# Patient Record
Sex: MALE | Race: WHITE | HISPANIC OR LATINO | Employment: FULL TIME | URBAN - METROPOLITAN AREA
[De-identification: names, ages, dates, MRNs, and addresses within clinical notes are randomized per-mention and may not be internally consistent; named-entity substitution may affect disease eponyms.]

---

## 2017-07-19 ENCOUNTER — HOSPITAL ENCOUNTER (EMERGENCY)
Facility: MEDICAL CENTER | Age: 32
End: 2017-07-19
Attending: EMERGENCY MEDICINE
Payer: COMMERCIAL

## 2017-07-19 VITALS
RESPIRATION RATE: 18 BRPM | TEMPERATURE: 99 F | WEIGHT: 152.31 LBS | DIASTOLIC BLOOD PRESSURE: 72 MMHG | OXYGEN SATURATION: 98 % | BODY MASS INDEX: 23.08 KG/M2 | SYSTOLIC BLOOD PRESSURE: 139 MMHG | HEIGHT: 68 IN | HEART RATE: 90 BPM

## 2017-07-19 DIAGNOSIS — F41.8 SITUATIONAL ANXIETY: ICD-10-CM

## 2017-07-19 DIAGNOSIS — J02.9 PHARYNGITIS, UNSPECIFIED ETIOLOGY: ICD-10-CM

## 2017-07-19 LAB
DEPRECATED S PYO AG THROAT QL EIA: NORMAL
SIGNIFICANT IND 70042: NORMAL
SITE SITE: NORMAL
SOURCE SOURCE: NORMAL

## 2017-07-19 PROCEDURE — 87880 STREP A ASSAY W/OPTIC: CPT

## 2017-07-19 PROCEDURE — 87081 CULTURE SCREEN ONLY: CPT

## 2017-07-19 PROCEDURE — 99284 EMERGENCY DEPT VISIT MOD MDM: CPT

## 2017-07-19 ASSESSMENT — ENCOUNTER SYMPTOMS
COUGH: 0
ABDOMINAL PAIN: 0
SORE THROAT: 1
FEVER: 1

## 2017-07-19 ASSESSMENT — PAIN SCALES - GENERAL: PAINLEVEL_OUTOF10: 0

## 2017-07-19 NOTE — ED AVS SNAPSHOT
7/19/2017    Mack Vance  [address]   [place] [address]    Dear Mack:    [facility] wants to ensure your discharge home is safe and you or your loved ones have had all of your questions answered regarding your care after you leave the hospital.    Below is a list of resources and contact information should you have any questions regarding your hospital stay, follow-up instructions, or active medical symptoms.    Questions or Concerns Regarding… Contact   Medical Questions Related to Your Discharge  (7 days a week, 8am-5pm) Contact a Nurse Care Coordinator   [phone]   Medical Questions Not Related to Your Discharge  (24 hours a day / 7 days a week)  Contact the Nurse Health Line   [phone]    Medications or Discharge Instructions Refer to your discharge packet   or contact your [facility] Primary Care Provider   [phone]   Follow-up Appointment(s) Schedule your appointment via Sourcebits   or contact Scheduling [phone]   Billing Review your statement via Sourcebits  or contact Billing [phone]   Medical Records Review your records via Sourcebits   or contact Medical Records [phone]     You may receive a telephone call within two days of discharge. This call is to make certain you understand your discharge instructions and have the opportunity to have any questions answered. You can also easily access your medical information, test results and upcoming appointments via the Sourcebits free online health management tool. You can learn more and sign up at Medocity/Sourcebits. For assistance setting up your Sourcebits account, please call [phone].    Once again, we want to ensure your discharge home is safe and that you have a clear understanding of any next steps in your care. If you have any questions or concerns, please do not hesitate to contact us, we are here for you. Thank you for choosing [facility] for your healthcare needs.    Sincerely,    Your [facility], [place], operated by [facility]  Team

## 2017-07-19 NOTE — ED PROVIDER NOTES
"ED Provider Note    Scribed for Marek Munroe M.D. by Bill Mcdonough. 7/19/2017, 4:19 PM.    Primary care provider: Pcp Pt States None  Means of arrival: Walk-in  History obtained from: Patient  History limited by: None    CHIEF COMPLAINT  Chief Complaint   Patient presents with   • Sore Throat     started 2 weeks ago. denies trouble swallowing or eating. pt states he noticed back of throat swollen        HPI  Mack Vance is a 31 y.o. male who presents to the Emergency Department for evaluation of constant sore throat, onset 3 days ago on Monday.  Patient states the pain is worse in the morning. This morning he looked at the back of his throat and stated it \"looked ugly\" so he wanted to come into the ED for further evaluation. Patient denies any associated symptoms of runny nose, cough or abdominal pain. Patient has a low grade fever. He has no pertinent medical history and no further complaints at this time.     REVIEW OF SYSTEMS  Review of Systems   Constitutional: Positive for fever.   HENT: Positive for sore throat. Negative for congestion.    Respiratory: Negative for cough.    Gastrointestinal: Negative for abdominal pain.       PAST MEDICAL HISTORY  No pertinent medical history     SURGICAL HISTORY  patient denies any surgical history    SOCIAL HISTORY  Social History   Substance Use Topics   • Smoking status: Never Smoker    • Smokeless tobacco: Never Used   • Alcohol Use: No      History   Drug Use No       FAMILY HISTORY  No pertinent family history    CURRENT MEDICATIONS  No current facility-administered medications on file prior to encounter.     No current outpatient prescriptions on file prior to encounter.     ALLERGIES  No Known Allergies    PHYSICAL EXAM  VITAL SIGNS: /66 mmHg  Pulse 101  Temp(Src) 37.2 °C (99 °F)  Resp 18  Ht 1.727 m (5' 8\")  Wt 69.088 kg (152 lb 5 oz)  BMI 23.16 kg/m2  SpO2 98%  Vitals reviewed.  Constitutional: Well developed, Well " nourished, No acute distress, Non-toxic appearance.   HENT: Normocephalic, Atraumatic, Bilateral external ears normal, Oropharynx moist, [place] to posterior pharynxNo oral exudates, Nose normal.  No exudates or signs of abscess.  Eyes: PERRL, EOMI, Conjunctiva normal, No discharge.   Neck: Normal range of motion, No tenderness, Supple, No stridor.  No lymphadenopathy  Cardiovascular: Normal heart rate, Normal rhythm, No murmurs, No rubs, No gallops.   Thorax & Lungs: Normal breath sounds, No respiratory distress, No wheezing, No chest tenderness.   Abdomen: Bowel sounds normal, Soft, No tenderness  Skin: Warm, Dry, No erythema, No rash.   Back: No tenderness, No CVA tenderness.   Musculoskeletal: Good range of motion in all major joints. No edema. No tenderness to palpation or major deformities noted.   Neurologic: Alert, Normal motor function, Normal sensory function, No focal deficits noted.   Psychiatric: Affect normal      LABS  Results for orders placed or performed during the hospital encounter of 07/19/17   RAPID STREP, CULT IF INDICATED (CULTURE IF NEGATIVE)   Result Value Ref Range    Significant Indicator NEG     Source THRT     Site THROAT     Rapid Strep Screen       Negative for Group A streptococcus.  A negative result may be obtained if the specimen is  inadequate or antigen concentration is below the  sensitivity of the test. This negative test will be followed  up with a culture as requested.        All labs reviewed by me.    COURSE & MEDICAL DECISION MAKING  Pertinent Labs & Imaging studies reviewed. (See chart for details)    Obtained and reviewed past medical records from September 2014 which indicated patient was seen and treated for wrist laceration.    4:19 PM Patient seen and examined at bedside. The patient presents with sore throat, and the differential diagnosis includes but is not limited to strep throat, upper respiratory virus, or other.  Ordered for Rapid strep culture to  evaluate.  Center criteria is 0    Rapid strep is negative.  His afebrile and nontoxic.  The sore throat is only present for a few days.  No signs of abscess.  I suspect this is a viral etiology.  No indication of a axis time.  We discharged with return precautions and follow-up.  Return for worsening sore throat, fever, or other concerns.  Questions are answered and he is discharged in good condition.      FINAL IMPRESSION  1. Pharyngitis, unspecified etiology    2. Situational anxiety          Bill EPPS (Scribe), am scribing for, and in the presence of, Marek Munroe M.D..    Electronically signed by: Bill Mcdonough (Scribe), 7/19/2017    Marek EPPS M.D. personally performed the services described in this documentation, as scribed by Bill Mcdonough in my presence, and it is both accurate and complete.    The note accurately reflects work and decisions made by me.  Marek Munroe  7/19/2017  5:59 PM

## 2017-07-19 NOTE — ED AVS SNAPSHOT
[employer] Access Code: [email or web address]  Expires: 8/18/2017  5:19 PM    Your email address is not on file at [facility].  Email Addresses are required for you to sign up for [employer], please contact [phone] to verify your personal information and to provide your email address prior to attempting to register for [employer].    Mack Lopez ManuelDennis  [address]   [place] [address]    BigDNAt  A secure, online tool to manage your health information     [facility]’s [employer]® is a secure, online tool that connects you to your personalized health information from the privacy of your home -- day or night - making it very easy for you to manage your healthcare. Once the activation process is completed, you can even access your medical information using the [employer] dahlia, which is available for free in the Apple Dahlia store or Google Play store.     To learn more about [employer], visit [email or web address]    There are two levels of access available (as shown below):   My Chart Features  [facility] Primary Care Doctor [facility]  Specialists [facility]  Urgent  Care Non-[facility] Primary Care Doctor   Email your healthcare team securely and privately 24/7 X X X    Manage appointments: schedule your next appointment; view details of past/upcoming appointments X      Request prescription refills. X      View recent personal medical records, including lab and immunizations X X X X   View health record, including health history, allergies, medications X X X X   Read reports about your outpatient visits, procedures, consult and ER notes X X X X   See your discharge summary, which is a recap of your hospital and/or ER visit that includes your diagnosis, lab results, and care plan X X  X     How to register for BigDNAt:  Once your e-mail address has been verified, follow the following steps to sign up for BigDNAt.     1. Go to  [email or web address]  2. Click on the Sign Up Now box, which takes you to the New  Member Sign Up page. You will need to provide the following information:  a. Enter your Eachpal Access Code exactly as it appears at the top of this page. (You will not need to use this code after you’ve completed the sign-up process. If you do not sign up before the expiration date, you must request a new code.)   b. Enter your date of birth.   c. Enter your home email address.   d. Click Submit, and follow the next screen’s instructions.  3. Create a ContinuumRxt ID. This will be your Eachpal login ID and cannot be changed, so think of one that is secure and easy to remember.  4. Create a Eachpal password. You can change your password at any time.  5. Enter your Password Reset Question and Answer. This can be used at a later time if you forget your password.   6. Enter your e-mail address. This allows you to receive e-mail notifications when new information is available in Eachpal.  7. Click Sign Up. You can now view your health information.    For assistance activating your Eachpal account, call [phone]

## 2017-07-19 NOTE — ED NOTES
Chief Complaint   Patient presents with   • Sore Throat     started 2 weeks ago. denies trouble swallowing or eating. pt states he noticed back of throat swollen

## 2017-07-19 NOTE — ED AVS SNAPSHOT
Home Care Instructions                                                                                                                Mack Vance   MRN: [id number]    Department:  [facility], Emergency Dept   Date of Visit:  7/19/2017            [facility], Emergency Dept    [address]    [place] [address]    Phone:  [phone]      You were seen by     Marek Munroe M.D.      Your Diagnosis Was     Pharyngitis, unspecified etiology     J02.9       Medication Information     Review all of your home medications and newly ordered medications with your primary doctor and/or pharmacist as soon as possible. Follow medication instructions as directed by your doctor and/or pharmacist.     Please keep your complete medication list with you and share with your physician. Update the information when medications are discontinued, doses are changed, or new medications (including over-the-counter products) are added; and carry medication information at all times in the event of emergency situations.               Medication List      Notice     You have not been prescribed any medications.            Procedures and tests performed during your visit     BETA STREP SCREEN (GP. A)    RAPID STREP, CULT IF INDICATED (CULTURE IF NEGATIVE)        Discharge Instructions       Ibuprofen or time for pain.  She complained fluids.  Worsening sore throat, fever, or other concerns.      Faringitis  (Pharyngitis)  La faringitis ocurre cuando la faringe presenta enrojecimiento, dolor e hinchazón (inflamación).   CAUSAS   Normalmente, la faringitis se debe a gina infección. Generalmente, estas infecciones ocurren debido a virus (viral) y se presentan cuando las personas se resfrían. Sin embargo, a veces la faringitis es provocada por bacterias (bacteriana). Las alergias también pueden ser gina causa de la faringitis. La faringitis viral se puede contagiar de gina persona a otra  al toser, estornudar y compartir objetos o utensilios personales (tazas, tenedores, cucharas, cepillos de diente). La faringitis bacteriana se puede contagiar de gina persona a otra a través de un contacto más íntimo, stevo besar.   SIGNOS Y SÍNTOMAS   Los síntomas de la faringitis incluyen los siguientes:   · Dolor de garganta.  · Cansancio (fatiga).  · Fiebre no muy elevada.  · Dolor de taras.  · Paty musculares y en las articulaciones.  · Erupciones cutáneas  · Ganglios linfáticos hinchados.  · Gina película parecida a las placas en la garganta o las amígdalas (frecuente con la faringitis bacteriana).  DIAGNÓSTICO   El médico le hará preguntas sobre la enfermedad y loki síntomas. Normalmente, todo lo que se necesita para diagnosticar gina faringitis son loki antecedentes médicos y un examen físico. A veces se realiza gina prueba rápida para estreptococos. También es posible que se realicen otros análisis de laboratorio, según la posible causa.   TRATAMIENTO   La faringitis viral normalmente mejorará en un plazo de 3 a 4 días sin medicamentos. La faringitis bacteriana se trata con medicamentos que lian los gérmenes (antibióticos).   INSTRUCCIONES PARA EL CUIDADO EN EL HOGAR   · Neida gran cantidad de líquido para mantener la orina de dima stevie o color amarillo pálido.  · Earth solo medicamentos de venta baldemar o recetados, según las indicaciones del médico.  ¨ Si le receta antibióticos, asegúrese de terminarlos, incluso si comienza a sentirse mejor.  ¨ No tome aspirina.  · Descanse lo suficiente.  · Hágase gárgaras con 8 onzas (227 ml) de agua con sal (½ cucharadita de sal por litro de agua) cada 1 o 2 horas para calmar la garganta.  · Puede usar pastillas (si no corre riesgo de ahogarse) o aerosoles para calmar la garganta.  SOLICITE ATENCIÓN MÉDICA SI:   · Tiene bultos grandes y dolorosos en el jonah.  · Tiene gina erupción cutánea.  · Cuando tose elimina gina expectoración diogo, amarillo amarronado o con  amanda.  SOLICITE ATENCIÓN MÉDICA DE INMEDIATO SI:   · El jonah se pone rígido.  · Comienza a babear o no puede tragar líquidos.  · Vomita o no puede retener los medicamentos ni los líquidos.  · Siente un dolor intenso que no se pau con los medicamentos recomendados.  · Tiene dificultades para respirar (y no debido a la nariz tapada).  ASEGÚRESE DE QUE:   · Comprende estas instrucciones.  · Controlará farrell afección.  · Recibirá ayuda de inmediato si no mejora o si empeora.     Esta información no tiene stevo fin reemplazar el consejo del médico. Asegúrese de hacerle al médico cualquier pregunta que tenga.     Document Released: 09/27/2006 Document Revised: 10/08/2014  [employer] Interactive Patient Education ©2016 Elsevier Inc.            Patient Information     Patient Information    Following emergency treatment: all patient requiring follow-up care must return either to a private physician or a clinic if your condition worsens before you are able to obtain further medical attention, please return to the emergency room.     Billing Information    At [facility], we work to make the billing process streamlined for our patients.  Our Representatives are here to answer any questions you may have regarding your hospital bill.  If you have insurance coverage and have supplied your insurance information to us, we will submit a claim to your insurer on your behalf.  Should you have any questions regarding your bill, we can be reached online or by phone as follows:  Online: You are able pay your bills online or live chat with our representatives about any billing questions you may have. We are here to help Monday - Friday from 8:00am to 7:30pm and 9:00am - 12:00pm on Saturdays.  Please visit [email or web address]  for more information.   Phone:  [phone] or [phone]    Please note that your emergency physician, surgeon, pathologist, radiologist, anesthesiologist, and other  specialists are not employed by [facility] and will therefore bill separately for their services.  Please contact them directly for any questions concerning their bills at the numbers below:     Emergency Physician Services:  [phone]  [facility]:  [phone]  [facility]:  [phone]  [facility]:  [phone]    1. Your final bill may vary from the amount quoted upon discharge if all procedures are not complete at that time, or if your doctor has additional procedures of which we are not aware. You will receive an additional bill if you return to the Emergency Department at [facility] for suture removal regardless of the facility of which the sutures were placed.     2. Please arrange for settlement of this account at the emergency registration.    3. All self-pay accounts are due in full at the time of treatment.  If you are unable to meet this obligation then payment is expected within 4-5 days.     4. If you have had radiology studies (CT, X-ray, Ultrasound, MRI), you have received a preliminary result during your emergency department visit. Please contact the radiology department [phone] to receive a copy of your final result. Please discuss the Final result with your primary physician or with the follow up physician provided.     Crisis Hotline:  [place] Crisis Hotline:  4-694-WXVXCUP or 1-833.863.3234  [place] Crisis Hotline:    1-164.887.1777 or 063-495-2052         ED Discharge Follow Up Questions    1. In order to provide you with very good care, we would like to follow up with a phone call in the next few days.  May we have your permission to contact you?     YES /  NO    2. What is the best phone number to call you? (       )_____-__________    3. What is the best time to call you?      Morning  /  Afternoon  /  Evening                   Patient Signature:  ____________________________________________________________    Date:   ____________________________________________________________

## 2017-07-20 NOTE — DISCHARGE INSTRUCTIONS
Ibuprofen or time for pain.  She complained fluids.  Worsening sore throat, fever, or other concerns.      Faringitis  (Pharyngitis)  La faringitis ocurre cuando la faringe presenta enrojecimiento, dolor e hinchazón (inflamación).   CAUSAS   Normalmente, la faringitis se debe a gina infección. Generalmente, estas infecciones ocurren debido a virus (viral) y se presentan cuando las personas se resfrían. Sin embargo, a veces la faringitis es provocada por bacterias (bacteriana). Las alergias también pueden ser gina causa de la faringitis. La faringitis viral se puede contagiar de gina persona a otra al toser, estornudar y compartir objetos o utensilios personales (tazas, tenedores, cucharas, cepillos de diente). La faringitis bacteriana se puede contagiar de gina persona a otra a través de un contacto más íntimo, stevo besar.   SIGNOS Y SÍNTOMAS   Los síntomas de la faringitis incluyen los siguientes:   · Dolor de garganta.  · Cansancio (fatiga).  · Fiebre no muy elevada.  · Dolor de taras.  · Paty musculares y en las articulaciones.  · Erupciones cutáneas  · Ganglios linfáticos hinchados.  · Gina película parecida a las placas en la garganta o las amígdalas (frecuente con la faringitis bacteriana).  DIAGNÓSTICO   El médico le hará preguntas sobre la enfermedad y loki síntomas. Normalmente, todo lo que se necesita para diagnosticar gina faringitis son loki antecedentes médicos y un examen físico. A veces se realiza gina prueba rápida para estreptococos. También es posible que se realicen otros análisis de laboratorio, según la posible causa.   TRATAMIENTO   La faringitis viral normalmente mejorará en un plazo de 3 a 4 días sin medicamentos. La faringitis bacteriana se trata con medicamentos que lian los gérmenes (antibióticos).   INSTRUCCIONES PARA EL CUIDADO EN EL HOGAR   · Neida gran cantidad de líquido para mantener la orina de dima stevie o color amarillo pálido.  · [place] solo medicamentos de venta baldemar o recetados,  según las indicaciones del médico.  ¨ Si le receta antibióticos, asegúrese de terminarlos, incluso si comienza a sentirse mejor.  ¨ No tome aspirina.  · Descanse lo suficiente.  · Hágase gárgaras con 8 onzas (227 ml) de agua con sal (½ cucharadita de sal por litro de agua) cada 1 o 2 horas para calmar la garganta.  · Puede usar pastillas (si no corre riesgo de ahogarse) o aerosoles para calmar la garganta.  SOLICITE ATENCIÓN MÉDICA SI:   · Tiene bultos grandes y dolorosos en el jonah.  · Tiene gina erupción cutánea.  · Cuando tose elimina gina expectoración diogo, amarillo amarronado o con amanda.  SOLICITE ATENCIÓN MÉDICA DE INMEDIATO SI:   · El jonah se pone rígido.  · Comienza a babear o no puede tragar líquidos.  · Vomita o no puede retener los medicamentos ni los líquidos.  · Siente un dolor intenso que no se pau con los medicamentos recomendados.  · Tiene dificultades para respirar (y no debido a la nariz tapada).  ASEGÚRESE DE QUE:   · Comprende estas instrucciones.  · Controlará farrell afección.  · Recibirá ayuda de inmediato si no mejora o si empeora.     Esta información no tiene stevo fin reemplazar el consejo del médico. Asegúrese de hacerle al médico cualquier pregunta que tenga.     Document Released: 09/27/2006 Document Revised: 10/08/2014  Elsevier Interactive Patient Education ©2016 Elsevier Inc.

## 2017-07-21 LAB
S PYO SPEC QL CULT: NORMAL
SIGNIFICANT IND 70042: NORMAL
SITE SITE: NORMAL
SOURCE SOURCE: NORMAL

## 2017-08-02 ENCOUNTER — OFFICE VISIT (OUTPATIENT)
Dept: URGENT CARE | Facility: CLINIC | Age: 32
End: 2017-08-02
Payer: COMMERCIAL

## 2017-08-02 VITALS
TEMPERATURE: 99.2 F | HEART RATE: 102 BPM | OXYGEN SATURATION: 98 % | WEIGHT: 147 LBS | DIASTOLIC BLOOD PRESSURE: 74 MMHG | RESPIRATION RATE: 16 BRPM | BODY MASS INDEX: 23.63 KG/M2 | HEIGHT: 66 IN | SYSTOLIC BLOOD PRESSURE: 140 MMHG

## 2017-08-02 DIAGNOSIS — B96.89 ACUTE BACTERIAL SINUSITIS: ICD-10-CM

## 2017-08-02 DIAGNOSIS — J01.90 ACUTE BACTERIAL SINUSITIS: ICD-10-CM

## 2017-08-02 PROCEDURE — 99203 OFFICE O/P NEW LOW 30 MIN: CPT | Performed by: NURSE PRACTITIONER

## 2017-08-02 RX ORDER — AMOXICILLIN AND CLAVULANATE POTASSIUM 875; 125 MG/1; MG/1
1 TABLET, FILM COATED ORAL 2 TIMES DAILY
Qty: 14 TAB | Refills: 0 | Status: SHIPPED | OUTPATIENT
Start: 2017-08-02 | End: 2021-03-13

## 2017-08-02 ASSESSMENT — ENCOUNTER SYMPTOMS
MYALGIAS: 0
DIZZINESS: 0
CHILLS: 0
WHEEZING: 0
VOMITING: 0
NAUSEA: 0
SORE THROAT: 0
COUGH: 1
SHORTNESS OF BREATH: 0
FEVER: 0
HEADACHES: 0
RHINORRHEA: 1

## 2017-08-02 NOTE — MR AVS SNAPSHOT
"        Mack Vance   2017 9:00 AM   Office Visit   MRN: [id number]    Department:  [facility]   Dept Phone:  [phone]    Description:  Male : 1985   Provider:  KETAN Stewart           Reason for Visit     Cough phlegm x 3 weeks and cough      Allergies as of 2017     No Known Allergies      You were diagnosed with     Acute bacterial sinusitis   [[id number]]         Vital Signs     Blood Pressure Pulse Temperature Respirations Height Weight    140/74 mmHg 102 37.3 °C (99.2 °F) 16 1.676 m (5' 6\") 66.679 kg (147 lb)    Body Mass Index Oxygen Saturation Smoking Status             23.74 kg/m2 98% Never Smoker          Basic Information     Date Of Birth Sex Race Ethnicity Preferred Language    1985 Male  or   Origin (Tristanian,Macanese,Singaporean,Japanese, etc) English      Health Maintenance     Patient has no pending health maintenance at this time      Current Immunizations     No immunizations on file.      Below and/or attached are the medications your provider expects you to take. Review all of your home medications and newly ordered medications with your provider and/or pharmacist. Follow medication instructions as directed by your provider and/or pharmacist. Please keep your medication list with you and share with your provider. Update the information when medications are discontinued, doses are changed, or new medications (including over-the-counter products) are added; and carry medication information at all times in the event of emergency situations     Allergies:  No Known Allergies          Medications  Valid as of: 2017 -  9:41 AM    Generic Name Brand Name Tablet Size Instructions for use    Amoxicillin-Pot Clavulanate (Tab) AUGMENTIN 875-125 MG Take 1 Tab by mouth 2 times a day.        .                 Medicines prescribed today were sent to:     [facility]ACY #[id number] - [place], [place] - [address] AT [facility] " MARLINE NEWMAN    [address] [place] [id number]    Phone: [phone] Fax: [phone]    Open 24 Hours?: No      Medication refill instructions:       If your prescription bottle indicates you have medication refills left, it is not necessary to call your provider’s office. Please contact your pharmacy and they will refill your medication.    If your prescription bottle indicates you do not have any refills left, you may request refills at any time through one of the following ways: The online Sitedesk system (except Urgent Care), by calling your provider’s office, or by asking your pharmacy to contact your provider’s office with a refill request. Medication refills are processed only during regular business hours and may not be available until the next business day. Your provider may request additional information or to have a follow-up visit with you prior to refilling your medication.   *Please Note: Medication refills are assigned a new Rx number when refilled electronically. Your pharmacy may indicate that no refills were authorized even though a new prescription for the same medication is available at the pharmacy. Please request the medicine by name with the pharmacy before contacting your provider for a refill.           Sitedesk Access Code: [email or web address]  Expires: 8/18/2017  5:19 PM    Sitedesk  A secure, online tool to manage your health information     [employer]’s Sitedesk® is a secure, online tool that connects you to your personalized health information from the privacy of your home -- day or night - making it very easy for you to manage your healthcare. Once the activation process is completed, you can even access your medical information using the Sitedesk dahlia, which is available for free in the Apple Dahlia store or Google Play store.     Sitedesk provides the following levels of access (as shown below):   My Chart Features   [facility] Doctor [facility]  Urgent  Care  [facility]  Primary [facility]  Doctor   Email your healthcare team securely and privately 24/7 X X X    Manage appointments: schedule your next appointment; view details of past/upcoming appointments X      Request prescription refills. X      View recent personal medical records, including lab and immunizations X X X X   View health record, including health history, allergies, medications X X X X   Read reports about your outpatient visits, procedures, consult and ER notes X X X X   See your discharge summary, which is a recap of your hospital and/or ER visit that includes your diagnosis, lab results, and care plan. X X       How to register for DoubleUp:  1. Go to  [email or web address].  2. Click on the Sign Up Now box, which takes you to the New Member Sign Up page. You will need to provide the following information:  a. Enter your DoubleUp Access Code exactly as it appears at the top of this page. (You will not need to use this code after you’ve completed the sign-up process. If you do not sign up before the expiration date, you must request a new code.)   b. Enter your date of birth.   c. Enter your home email address.   d. Click Submit, and follow the next screen’s instructions.  3. Create a DoubleUp ID. This will be your DoubleUp login ID and cannot be changed, so think of one that is secure and easy to remember.  4. Create a DoubleUp password. You can change your password at any time.  5. Enter your Password Reset Question and Answer. This can be used at a later time if you forget your password.   6. Enter your e-mail address. This allows you to receive e-mail notifications when new information is available in DoubleUp.  7. Click Sign Up. You can now view your health information.    For assistance activating your DoubleUp account, call [phone]

## 2017-08-02 NOTE — PROGRESS NOTES
"Subjective:      Mack Vance is a 31 y.o. male who presents with Cough            Cough  This is a new problem. The current episode started 1 to 4 weeks ago. The problem has been unchanged. The problem occurs constantly. The cough is non-productive. Associated symptoms include nasal congestion, postnasal drip and rhinorrhea. Pertinent negatives include no chest pain, chills, fever, headaches, myalgias, sore throat, shortness of breath or wheezing. Treatments tried: no over the counter medications. There is no history of asthma or environmental allergies.     Allergies, medications and history reviewed by me today    Review of Systems   Constitutional: Negative for fever and chills.   HENT: Positive for congestion, postnasal drip and rhinorrhea. Negative for sore throat.    Respiratory: Positive for cough. Negative for shortness of breath and wheezing.    Cardiovascular: Negative for chest pain.   Gastrointestinal: Negative for nausea and vomiting.   Musculoskeletal: Negative for myalgias.   Neurological: Negative for dizziness and headaches.   Endo/Heme/Allergies: Negative for environmental allergies.          Objective:     /74 mmHg  Pulse 102  Temp(Src) 37.3 °C (99.2 °F)  Resp 16  Ht 1.676 m (5' 6\")  Wt 66.679 kg (147 lb)  BMI 23.74 kg/m2  SpO2 98%     Physical Exam   Constitutional: He is oriented to person, place, and time. He appears well-developed and well-nourished. No distress.   HENT:   Head: Normocephalic and atraumatic.   Right Ear: External ear and ear canal normal. Tympanic membrane is not injected and not perforated. No middle ear effusion.   Left Ear: External ear and ear canal normal. Tympanic membrane is not injected and not perforated.  No middle ear effusion.   Nose: Mucosal edema present.   Mouth/Throat: Posterior oropharyngeal erythema present. No oropharyngeal exudate.   Eyes: Conjunctivae are normal. Right eye exhibits no discharge. Left eye exhibits no " discharge.   Neck: Normal range of motion. Neck supple.   Cardiovascular: Normal rate, regular rhythm and normal heart sounds.    No murmur heard.  Pulmonary/Chest: Effort normal and breath sounds normal. No respiratory distress.   Musculoskeletal: Normal range of motion.   Normal movement of all 4 extremities.   Lymphadenopathy:     He has no cervical adenopathy.        Right: No supraclavicular adenopathy present.        Left: No supraclavicular adenopathy present.   Neurological: He is alert and oriented to person, place, and time. Gait normal.   Skin: Skin is warm and dry.   Psychiatric: He has a normal mood and affect. His behavior is normal. Thought content normal.   Nursing note and vitals reviewed.              Assessment/Plan:     1. Acute bacterial sinusitis  amoxicillin-clavulanate (AUGMENTIN) 875-125 MG Tab     Reviewed OTC medications that he may use as well.   Translation done via his friend in the room.  Differential diagnosis, natural history, supportive care, and indications for immediate follow-up discussed at length.

## 2017-11-24 ENCOUNTER — OFFICE VISIT (OUTPATIENT)
Dept: URGENT CARE | Facility: CLINIC | Age: 32
End: 2017-11-24
Payer: COMMERCIAL

## 2017-11-24 VITALS
HEART RATE: 104 BPM | BODY MASS INDEX: 24.04 KG/M2 | DIASTOLIC BLOOD PRESSURE: 78 MMHG | SYSTOLIC BLOOD PRESSURE: 124 MMHG | RESPIRATION RATE: 16 BRPM | HEIGHT: 66 IN | TEMPERATURE: 98.9 F | WEIGHT: 149.6 LBS | OXYGEN SATURATION: 99 %

## 2017-11-24 DIAGNOSIS — R19.5 DARK STOOLS: ICD-10-CM

## 2017-11-24 PROCEDURE — 99214 OFFICE O/P EST MOD 30 MIN: CPT | Performed by: FAMILY MEDICINE

## 2017-11-24 ASSESSMENT — PAIN SCALES - GENERAL: PAINLEVEL: NO PAIN

## 2017-11-30 ASSESSMENT — ENCOUNTER SYMPTOMS
CONSTIPATION: 0
BLOOD IN STOOL: 0

## 2020-02-02 ENCOUNTER — APPOINTMENT (OUTPATIENT)
Dept: RADIOLOGY | Facility: IMAGING CENTER | Age: 35
End: 2020-02-02
Attending: FAMILY MEDICINE
Payer: COMMERCIAL

## 2020-02-02 ENCOUNTER — OFFICE VISIT (OUTPATIENT)
Dept: URGENT CARE | Facility: CLINIC | Age: 35
End: 2020-02-02
Payer: COMMERCIAL

## 2020-02-02 VITALS
OXYGEN SATURATION: 97 % | TEMPERATURE: 98.3 F | RESPIRATION RATE: 16 BRPM | HEART RATE: 100 BPM | WEIGHT: 170.3 LBS | HEIGHT: 65 IN | DIASTOLIC BLOOD PRESSURE: 60 MMHG | BODY MASS INDEX: 28.37 KG/M2 | SYSTOLIC BLOOD PRESSURE: 132 MMHG

## 2020-02-02 DIAGNOSIS — M25.571 ACUTE RIGHT ANKLE PAIN: Primary | ICD-10-CM

## 2020-02-02 PROCEDURE — 99213 OFFICE O/P EST LOW 20 MIN: CPT | Performed by: FAMILY MEDICINE

## 2020-02-02 PROCEDURE — 73610 X-RAY EXAM OF ANKLE: CPT | Mod: TC,RT | Performed by: FAMILY MEDICINE

## 2020-02-02 ASSESSMENT — ENCOUNTER SYMPTOMS
TINGLING: 0
LOSS OF SENSATION: 0
INABILITY TO BEAR WEIGHT: 0
MUSCLE WEAKNESS: 0
LOSS OF MOTION: 0
NUMBNESS: 0

## 2020-02-02 NOTE — LETTER
February 2, 2020         Patient: Mack Vance   YOB: 1985   Date of Visit: 2/2/2020           To Whom it May Concern:    Mack Vance was seen in my clinic on 2/2/2020. He may return to work on 02/04/202..    If you have any questions or concerns, please don't hesitate to call.        Sincerely,           Hu Lane M.D.  Electronically Signed

## 2020-02-03 NOTE — PATIENT INSTRUCTIONS
Ankle Pain  Many things can cause ankle pain, including an injury to the area and overuse of the ankle. The ankle joint holds your body weight and allows you to move around. Ankle pain can occur on either side or the back of one ankle or both ankles. Ankle pain may be sharp and burning or dull and aching. There may be tenderness, stiffness, redness, or warmth around the ankle.  Follow these instructions at home:  Activity  · Rest your ankle as told by your health care provider. Avoid any activities that cause ankle pain.  · Do exercises as told by your health care provider.  · Ask your health care provider if you can drive.  Using a brace, a bandage, or crutches  · If you were given a brace:  ¨ Wear it as told by your health care provider.  ¨ Remove it when you take a bath or a shower.  ¨ Try not to move your ankle very much, but wiggle your toes from time to time. This helps to prevent swelling.  · If you were given an elastic bandage:  ¨ Remove it when you take a bath or a shower.  ¨ Try not to move your ankle very much, but wiggle your toes from time to time. This helps to prevent swelling.  ¨ Adjust the bandage to make it more comfortable if it feels too tight.  ¨ Loosen the bandage if you have numbness or tingling in your foot or if your foot turns cold and blue.  · If you have crutches, use them as told by your health care provider. Continue to use them until you can walk without feeling pain in your ankle.  Managing pain, stiffness, and swelling  · Raise (elevate) your ankle above the level of your heart while you are sitting or lying down.  · If directed, apply ice to the area:  ¨ Put ice in a plastic bag.  ¨ Place a towel between your skin and the bag.  ¨ Leave the ice on for 20 minutes, 2-3 times per day.  General instructions  · Keep all follow-up visits as told by your health care provider. This is important.  · Record this information that may be helpful for you and your health care provider:  ¨ How  often you have ankle pain.  ¨ Where the pain is located.  ¨ What the pain feels like.  · Take over-the-counter and prescription medicines only as told by your health care provider.  Contact a health care provider if:  · Your pain gets worse.  · Your pain is not relieved with medicines.  · You have a fever or chills.  · You are having more trouble with walking.  · You have new symptoms.  Get help right away if:  · Your foot, leg, toes, or ankle tingles or becomes numb.  · Your foot, leg, toes, or ankle becomes swollen.  · Your foot, leg, toes, or ankle turns pale or blue.  This information is not intended to replace advice given to you by your health care provider. Make sure you discuss any questions you have with your health care provider.  Document Released: 06/07/2011 Document Revised: 08/18/2017 Document Reviewed: 07/19/2016  We Interactive Patient Education © 2017 [employer].

## 2020-02-03 NOTE — PROGRESS NOTES
"Subjective:      Mack Vance is a 34 y.o. male who presents with Ankle Injury ((R) ankle, playing soccer, fell and landed on foot)            Patient was able to bear weight after the injury and walk    Ankle Injury    The incident occurred 6 to 12 hours ago. [place]. The injury mechanism was an eversion injury. The pain is present in the right ankle. The quality of the pain is described as aching. The pain is at a severity of 4/10. The pain is mild. The pain has been fluctuating since onset. Pertinent negatives include no inability to bear weight, loss of motion, loss of sensation, muscle weakness, numbness or tingling. He reports no foreign bodies present. Nothing aggravates the symptoms. The treatment provided no relief.       Review of Systems   Musculoskeletal: Positive for joint pain (right ankle).   Neurological: Negative for tingling and numbness.   All other systems reviewed and are negative.         Objective:     /60 (Patient Position: Sitting)   Pulse 100   Temp 36.8 °C (98.3 °F) (Temporal)   Resp 16   Ht 1.651 m (5' 5\")   Wt 77.2 kg (170 lb 4.8 oz)   SpO2 97%   BMI 28.34 kg/m²      Physical Exam  Vitals signs reviewed.   Constitutional:       General: He is not in acute distress.     Appearance: Normal appearance. He is normal weight. He is not ill-appearing, toxic-appearing or diaphoretic.   HENT:      Head: Normocephalic.      Nose: Nose normal.      Mouth/Throat:      Mouth: Mucous membranes are moist.   Eyes:      Extraocular Movements: Extraocular movements intact.      Pupils: Pupils are equal, round, and reactive to light.   Neck:      Musculoskeletal: Normal range of motion.   Cardiovascular:      Rate and Rhythm: Normal rate.      Pulses: Normal pulses.   Pulmonary:      Effort: Pulmonary effort is normal.   Musculoskeletal: Normal range of motion.         General: Tenderness present. No swelling, deformity or signs of injury.      " Right lower leg: No edema.      Left lower leg: No edema.        Feet:    Skin:     General: Skin is warm.      Capillary Refill: Capillary refill takes less than 2 seconds.   Neurological:      General: No focal deficit present.      Mental Status: He is alert.   Psychiatric:         Mood and Affect: Mood normal.                 Assessment/Plan:       1. Acute right ankle pain  1. Acute right ankle pain  DX-ANKLE 3+ VIEWS RIGHT       - DX-ANKLE 3+ VIEWS RIGHT    FINDINGS:  No acute fracture or dislocation. The ankle mortise is intact.     No joint arthropathy.     IMPRESSION:     No acute osseous abnormality.    We Ace wrap the ankle, explained to the patient that if he plays soccer again to use a ankle brace, use rice therapy  Rest, elevate, and use Tylenol and ibuprofen as needed for pain  All the red flag signs were reviewed with patient please come back to clinic if symptoms worsen or if you have any other concerns

## 2021-03-13 ENCOUNTER — OFFICE VISIT (OUTPATIENT)
Dept: URGENT CARE | Facility: CLINIC | Age: 36
End: 2021-03-13
Payer: COMMERCIAL

## 2021-03-13 VITALS
TEMPERATURE: 98.3 F | HEIGHT: 66 IN | WEIGHT: 163 LBS | HEART RATE: 93 BPM | BODY MASS INDEX: 26.2 KG/M2 | DIASTOLIC BLOOD PRESSURE: 80 MMHG | OXYGEN SATURATION: 99 % | SYSTOLIC BLOOD PRESSURE: 140 MMHG | RESPIRATION RATE: 16 BRPM

## 2021-03-13 DIAGNOSIS — R20.0 NUMBNESS OF FINGER: ICD-10-CM

## 2021-03-13 DIAGNOSIS — S61.012A LACERATION OF LEFT THUMB WITHOUT FOREIGN BODY WITHOUT DAMAGE TO NAIL, INITIAL ENCOUNTER: ICD-10-CM

## 2021-03-13 PROCEDURE — 12001 RPR S/N/AX/GEN/TRNK 2.5CM/<: CPT | Performed by: NURSE PRACTITIONER

## 2021-03-13 ASSESSMENT — ENCOUNTER SYMPTOMS
SORE THROAT: 0
SHORTNESS OF BREATH: 0
NAUSEA: 0
EYE PAIN: 0
MYALGIAS: 0
VOMITING: 0
DIZZINESS: 0
ROS SKIN COMMENTS: LACERATION LEFT THUMB
TINGLING: 1
FEVER: 0
CHILLS: 0

## 2021-03-14 NOTE — PROCEDURES
Laceration Repair    Date/Time: 3/13/2021 11:28 PM  Performed by: INO Green  Authorized by: INO Green   Body area: upper extremity  Location details: left thumb  Laceration length: 2 cm  Foreign bodies: no foreign bodies  Tendon involvement: none  Nerve involvement: superficial  Vascular damage: no    Anesthesia:  Local Anesthetic: lidocaine 1% without epinephrine  Anesthetic total: 2 mL    Sedation:  Patient sedated: no    Irrigation solution: saline  Irrigation method: syringe  Amount of cleaning: standard  Debridement: none  Degree of undermining: none  Skin closure: 5-0 nylon  Number of sutures: 6  Approximation: close  Approximation difficulty: simple  Dressing: antibiotic ointment and pressure dressing  Patient tolerance: patient tolerated the procedure well with no immediate complications

## 2021-03-14 NOTE — PROGRESS NOTES
"Subjective:   Mack Vance is a 35 y.o. male who presents for Laceration ((L) thumb, Tip of thumb feels numb x1 Hour ago)      Laceration   The incident occurred less than 1 hour ago. The laceration is located on the left hand. The laceration is 2 cm in size. Injury mechanism: metal can  The pain is at a severity of 3/10. The pain is mild. The pain has been constant since onset. He reports no foreign bodies present. His tetanus status is UTD.       Review of Systems   Constitutional: Negative for chills and fever.   HENT: Negative for sore throat.    Eyes: Negative for pain.   Respiratory: Negative for shortness of breath.    Cardiovascular: Negative for chest pain.   Gastrointestinal: Negative for nausea and vomiting.   Genitourinary: Negative for hematuria.   Musculoskeletal: Negative for myalgias.   Skin: Negative for rash.        Laceration left thumb   Neurological: Positive for tingling. Negative for dizziness.       Medications:    • Bismuth Subsalicylate Susp    Allergies: Patient has no known allergies.    Problem List: Mack Vance does not have a problem list on file.    Surgical History:  No past surgical history on file.    Past Social Hx: Mack Vance  reports that he has never smoked. He has never used smokeless tobacco. He reports that he does not drink alcohol and does not use drugs.     Past Family Hx:  Mack Vance family history is not on file.     Problem list, medications, and allergies reviewed by myself today in Epic.     Objective:     /80 (BP Location: Right arm, Patient Position: Sitting, BP Cuff Size: Adult long)   Pulse 93   Temp 36.8 °C (98.3 °F) (Temporal)   Resp 16   Ht 1.676 m (5' 6\")   Wt 73.9 kg (163 lb)   SpO2 99%   BMI 26.31 kg/m²     Physical Exam  Constitutional:       Appearance: Normal appearance. He is not ill-appearing or toxic-appearing.   HENT:      Head: Normocephalic.      Right " Ear: External ear normal.      Left Ear: External ear normal.      Nose: Nose normal.      Mouth/Throat:      Lips: Pink.      Mouth: Mucous membranes are moist.   Eyes:      General: Lids are normal.         Right eye: No discharge.         Left eye: No discharge.   Pulmonary:      Effort: Pulmonary effort is normal. No accessory muscle usage or respiratory distress.   Musculoskeletal:         General: Normal range of motion.      Left hand: Laceration (2cm partial-thickness laceration to the volar aspect of first phalanx at the MTP joint.  Decreased sensation distally.) present. No swelling, tenderness or bony tenderness. Normal range of motion. Decreased sensation of the median distribution. There is no disruption of two-point discrimination. Normal capillary refill. Normal pulse.      Cervical back: Full passive range of motion without pain.   Skin:     Coloration: Skin is not pale.   Neurological:      Mental Status: He is alert and oriented to person, place, and time.   Psychiatric:         Mood and Affect: Mood normal.         Thought Content: Thought content normal.         Assessment/Plan:     Diagnosis and associated orders:     1. Laceration of left thumb without foreign body without damage to nail, initial encounter  REFERRAL TO HAND SURGERY   2. Numbness of finger  REFERRAL TO HAND SURGERY      Comments/MDM:     • Patient is a 35-year-old male present with the stated above.  On physical exam patient having decreased sensation on initial exam.  Discussed possible nerve injury.  Patient does not wish to be seen in the emergency room this evening for consultation with specialist.  Discussed the risks of doing so.  Will repair laceration and place urgent referral to hand surgery for further evaluation management  •   • Laceration repaired with 6 sutures  •   • Wound care discussed  • Advised patient that sutures will need to remain in for 10 to 14 days  •   • Patient will follow up with hand surgery for  further evaluation management.\  • Differential diagnosis, natural history, supportive care, and indications for immediate follow-up discussed.            Please note that this dictation was created using voice recognition software. I have made a reasonable attempt to correct obvious errors, but I expect that there are errors of grammar and possibly content that I did not discover before finalizing the note.    This note was electronically signed by Jorge NICOLAS.

## 2021-03-27 ENCOUNTER — OFFICE VISIT (OUTPATIENT)
Dept: URGENT CARE | Facility: CLINIC | Age: 36
End: 2021-03-27
Payer: COMMERCIAL

## 2021-03-27 VITALS
WEIGHT: 161.2 LBS | RESPIRATION RATE: 16 BRPM | SYSTOLIC BLOOD PRESSURE: 102 MMHG | OXYGEN SATURATION: 97 % | DIASTOLIC BLOOD PRESSURE: 70 MMHG | HEIGHT: 66 IN | HEART RATE: 86 BPM | BODY MASS INDEX: 25.91 KG/M2

## 2021-03-27 DIAGNOSIS — Z48.02 VISIT FOR SUTURE REMOVAL: ICD-10-CM

## 2021-03-27 PROCEDURE — 99212 OFFICE O/P EST SF 10 MIN: CPT | Performed by: PHYSICIAN ASSISTANT

## 2021-03-27 NOTE — PROGRESS NOTES
HPI:   Presents for suture removal 14 days post procedure. Laceration without pain, discharge or redness.  3 of the sutures have ripped out on their own.  He continued to work despite laceration    ROS:   no fever, no sensory loss, no weakness    Exam:   Gen: WDWN in no distress  Wound: well healing laceration. 6 interrupted sutures removed without difficulty. No evidence of dehiscence or infection.  Neuro: sensory/motor intact     A/P   Laceration  Suture Removal  F/U prn

## 2021-09-09 NOTE — PROGRESS NOTES
"Subjective:      Mack Vance is a 32 y.o. male who presents with Diarrhea (dark stool x1 days)            History per . 1 day dark stool. Recent loose stool treated with peptobismol. No PMH PUD. No pain. No fever. No recent travel/abx/camping. Peptobismol was helpful. No other aggravating or alleviating factors.          Review of Systems   Constitutional: Negative for malaise/fatigue.   Gastrointestinal: Negative for blood in stool and constipation.   Genitourinary:        Normal urine output     .  Medications, Allergies, and current problem list reviewed today in Epic       Objective:     /78   Pulse (!) 104   Temp 37.2 °C (98.9 °F)   Resp 16   Ht 1.676 m (5' 6\")   Wt 67.9 kg (149 lb 9.6 oz)   SpO2 99%   BMI 24.15 kg/m²      Physical Exam   Constitutional: He appears well-developed and well-nourished. No distress.   Cardiovascular: Normal rate, regular rhythm and normal heart sounds.    Pulmonary/Chest: Effort normal and breath sounds normal. He has no wheezes.   Genitourinary:   Genitourinary Comments: JOSS: good tone, no mass, scant dark brown stool, guaiac negative   Neurological:   Speech is clear. Patient is appropriate and cooperative.                 Assessment/Plan:     1. Dark stools       Differential diagnosis, natural history, supportive care, and indications for immediate follow-up discussed at length.   Suspect due to pepto-bismol.  " Has The Cancer Been Biopsied Before?: has been previously biopsied Who Is Your Referring Provider?: MONTSE Gomez PA-C When Was Your Biopsy?: 07/27/2021 Body Location Override (Optional): Left post auricular Accession (Optional): [id number]

## 2021-11-08 ENCOUNTER — OFFICE VISIT (OUTPATIENT)
Dept: URGENT CARE | Facility: CLINIC | Age: 36
End: 2021-11-08
Payer: COMMERCIAL

## 2021-11-08 VITALS
BODY MASS INDEX: 27 KG/M2 | DIASTOLIC BLOOD PRESSURE: 64 MMHG | HEIGHT: 66 IN | SYSTOLIC BLOOD PRESSURE: 102 MMHG | HEART RATE: 97 BPM | WEIGHT: 168 LBS | RESPIRATION RATE: 14 BRPM | TEMPERATURE: 97.4 F | OXYGEN SATURATION: 98 %

## 2021-11-08 DIAGNOSIS — T78.40XA ALLERGIC REACTION, INITIAL ENCOUNTER: ICD-10-CM

## 2021-11-08 DIAGNOSIS — L50.9 URTICARIA: ICD-10-CM

## 2021-11-08 PROCEDURE — 99213 OFFICE O/P EST LOW 20 MIN: CPT | Performed by: PHYSICIAN ASSISTANT

## 2021-11-08 RX ORDER — METHYLPREDNISOLONE 4 MG/1
TABLET ORAL
Qty: 21 TABLET | Refills: 0 | Status: SHIPPED | OUTPATIENT
Start: 2021-11-08

## 2021-11-08 ASSESSMENT — ENCOUNTER SYMPTOMS
ABDOMINAL PAIN: 0
WHEEZING: 0
DIARRHEA: 0
SHORTNESS OF BREATH: 0
FEVER: 0
NAUSEA: 0
STRIDOR: 0
VOMITING: 0
CHILLS: 0

## 2021-11-08 NOTE — PROGRESS NOTES
"Subjective:   Mack Vance is a 36 y.o. male who presents for Allergic Reaction (x 1 week.  He has a rash all over his body. He thinks it is from seafood he ate. )      HPI  36 y.o. male presents to urgent care with new problem to provider of diffuse urticarial rash onset 1 week ago after eating shrimp and a fish soup.  Associated itching with no pain.  Hx of allergy to shrimp.   Denies hx of anaphylaxis.  No facial swelling, difficulty swallowing, or difficulty breathing.  Patient has been using topical Benadryl and alcohol solution with no improvement of rash. Denies other associated aggravating or alleviating factors.       Review of Systems   Constitutional: Negative for chills, fever and malaise/fatigue.   Respiratory: Negative for shortness of breath, wheezing and stridor.    Gastrointestinal: Negative for abdominal pain, diarrhea, nausea and vomiting.   Skin: Positive for itching and rash.   Endo/Heme/Allergies: Negative for environmental allergies.       There is no problem list on file for this patient.    History reviewed. No pertinent surgical history.  Social History     Tobacco Use   • Smoking status: Never Smoker   • Smokeless tobacco: Never Used   Vaping Use   • Vaping Use: Never used   Substance Use Topics   • Alcohol use: No   • Drug use: No      History reviewed. No pertinent family history.   (Allergies, Medications, & Tobacco/Substance Use were reconciled by the Medical Assistant and reviewed by myself. The family history is prepopulated)     Objective:     /64   Pulse 97   Temp 36.3 °C (97.4 °F) (Temporal)   Resp 14   Ht 1.676 m (5' 6\")   Wt 76.2 kg (168 lb)   SpO2 98%   BMI 27.12 kg/m²     Physical Exam  Vitals reviewed.   Constitutional:       Appearance: He is well-developed.   HENT:      Head: Normocephalic and atraumatic.   Eyes:      Conjunctiva/sclera: Conjunctivae normal.   Cardiovascular:      Rate and Rhythm: Normal rate and regular rhythm.   Pulmonary: "      Effort: Pulmonary effort is normal. No respiratory distress.   Abdominal:      Palpations: Abdomen is soft.   Musculoskeletal:      Cervical back: Normal range of motion and neck supple.   Skin:     General: Skin is warm and dry.      Findings: Erythema and rash present. Rash is urticarial.      Comments: Diffuse urticarial rash over torso and neck.    Neurological:      General: No focal deficit present.      Mental Status: He is alert and oriented to person, place, and time.   Psychiatric:         Mood and Affect: Mood normal.         Behavior: Behavior normal.         Thought Content: Thought content normal.         Judgment: Judgment normal.         Assessment/Plan:     1. Allergic reaction, initial encounter  methylPREDNISolone (MEDROL DOSEPAK) 4 MG Tablet Therapy Pack   2. Urticaria  methylPREDNISolone (MEDROL DOSEPAK) 4 MG Tablet Therapy Pack     Patient may discontinue topical treatments.  I recommend Benadryl at night.  Avoid allergens including shrimp.  Discussed red flags and ED precautions including development of facial swelling or difficulty swallowing/breathing.  Differential diagnosis, natural history, supportive care, and indications for immediate follow-up discussed.    Advised the patient to follow-up with the primary care physician for recheck, reevaluation, and consideration of further management.  Patient verbalized understanding of treatment plan and has no further questions regarding care.     I personally reviewed prior external notes and test results pertinent to today's visit.     Please note that this dictation was created using voice recognition software. I have made a reasonable attempt to correct obvious errors, but I expect that there are errors of grammar and possibly content that I did not discover before finalizing the note.    This note was electronically signed by Rita Cronin PA-C

## 2021-11-17 ENCOUNTER — TELEPHONE (OUTPATIENT)
Dept: URGENT CARE | Facility: CLINIC | Age: 36
End: 2021-11-17

## 2021-11-17 NOTE — TELEPHONE ENCOUNTER
Pt wife called and stated that her husbands rash is still there and she wants to know if they can get a refill or if they need to be seen again.